# Patient Record
Sex: FEMALE | Race: WHITE | NOT HISPANIC OR LATINO | Employment: UNEMPLOYED | URBAN - METROPOLITAN AREA
[De-identification: names, ages, dates, MRNs, and addresses within clinical notes are randomized per-mention and may not be internally consistent; named-entity substitution may affect disease eponyms.]

---

## 2018-07-22 ENCOUNTER — HOSPITAL ENCOUNTER (EMERGENCY)
Facility: HOSPITAL | Age: 53
Discharge: HOME | End: 2018-07-22
Attending: EMERGENCY MEDICINE
Payer: COMMERCIAL

## 2018-07-22 ENCOUNTER — APPOINTMENT (EMERGENCY)
Dept: RADIOLOGY | Facility: HOSPITAL | Age: 53
End: 2018-07-22
Payer: COMMERCIAL

## 2018-07-22 VITALS
SYSTOLIC BLOOD PRESSURE: 122 MMHG | RESPIRATION RATE: 16 BRPM | DIASTOLIC BLOOD PRESSURE: 61 MMHG | OXYGEN SATURATION: 97 % | WEIGHT: 106 LBS | HEART RATE: 86 BPM

## 2018-07-22 DIAGNOSIS — T14.8XXA PUNCTURE WOUND: Primary | ICD-10-CM

## 2018-07-22 DIAGNOSIS — L03.90 CELLULITIS: ICD-10-CM

## 2018-07-22 PROCEDURE — 73630 X-RAY EXAM OF FOOT: CPT

## 2018-07-22 PROCEDURE — 90471 IMMUNIZATION ADMIN: CPT

## 2018-07-22 PROCEDURE — 90715 TDAP VACCINE 7 YRS/> IM: CPT | Performed by: EMERGENCY MEDICINE

## 2018-07-22 PROCEDURE — 99283 EMERGENCY DEPT VISIT LOW MDM: CPT

## 2018-07-22 RX ORDER — CLINDAMYCIN HYDROCHLORIDE 300 MG/1
300 CAPSULE ORAL 4 TIMES DAILY
Qty: 28 CAPSULE | Refills: 0 | Status: SHIPPED | OUTPATIENT
Start: 2018-07-22 | End: 2018-07-29

## 2018-07-22 RX ORDER — GINSENG 100 MG
1 CAPSULE ORAL ONCE
Status: COMPLETED | OUTPATIENT
Start: 2018-07-22 | End: 2018-07-22

## 2018-07-22 RX ORDER — CLINDAMYCIN HYDROCHLORIDE 150 MG/1
300 CAPSULE ORAL ONCE
Status: COMPLETED | OUTPATIENT
Start: 2018-07-22 | End: 2018-07-22

## 2018-07-22 RX ORDER — DIPHENOXYLATE HYDROCHLORIDE AND ATROPINE SULFATE 2.5; .025 MG/1; MG/1
1 TABLET ORAL DAILY
COMMUNITY

## 2018-07-22 RX ADMIN — TETANUS TOXOID, REDUCED DIPHTHERIA TOXOID AND ACELLULAR PERTUSSIS VACCINE, ADSORBED 0.5 ML: 5; 2.5; 8; 8; 2.5 SUSPENSION INTRAMUSCULAR at 17:41

## 2018-07-22 RX ADMIN — CLINDAMYCIN HYDROCHLORIDE 300 MG: 150 CAPSULE ORAL at 17:42

## 2018-07-22 RX ADMIN — BACITRACIN ZINC 1 SMALL APPLICATION: 500 OINTMENT TOPICAL at 17:42

## 2018-07-22 NOTE — DISCHARGE INSTRUCTIONS
Cellulitis, Ambulatory Care   GENERAL INFORMATION:   Cellulitis  is a skin infection caused by bacteria  Common symptoms include the following:   · Fever    · A red, warm, swollen area on your skin    · Pain when the area is touched    · Bumps or blisters (abscess) that may drain pus    · Bumpy, raised skin that feels like an orange peel  Seek immediate care for the following symptoms:   · An increase in pain, redness, warmth, and size    · Red streaks coming from the infected area    · A thin, gray-brown discharge coming from your infected skin area    · A crackling under your skin when you touch it    · Purple dots or bumps on your skin, or bleeding under your skin    · New swelling and pain in your legs    · Sudden trouble breathing or chest pain  Treatment for cellulitis  may include medicines to treat the bacterial infection or decrease pain  The infection may need to be cleaned out  Damaged, dead, or infected tissue may need to be cut away to help your wound heal   Manage your symptoms:   · Elevate your wound above the level of your heart  as often as you can  This will help decrease swelling and pain  Prop your wound on pillows or blankets to keep it elevated comfortably  · Clean your wound as directed  You may need to wash the wound with soap and water  Look for signs of infection  · Wear pressure stockings as directed  The stockings are tight and put pressure on your legs  This improves blood flow and decreases swelling  Prevent cellulitis:   · Wash your hands often  Use soap and water  Wash your hands after you use the bathroom, change a child's diapers, or sneeze  Wash your hands before you prepare or eat food  Use lotion to prevent dry, cracked skin  · Do not share personal items, such as towels, clothing, and razors  · Clean exercise equipment  with germ-killing  before and after you use it    Follow up with your healthcare provider as directed:  Write down your questions so you remember to ask them during your visits  CARE AGREEMENT:   You have the right to help plan your care  Learn about your health condition and how it may be treated  Discuss treatment options with your caregivers to decide what care you want to receive  You always have the right to refuse treatment  The above information is an  only  It is not intended as medical advice for individual conditions or treatments  Talk to your doctor, nurse or pharmacist before following any medical regimen to see if it is safe and effective for you  © 2014 [address] is for End User's use only and may not be sold, redistributed or otherwise used for commercial purposes  All illustrations and images included in CareNotes® are the copyrighted property of [employer]  or David Adams

## 2018-07-22 NOTE — ED PROVIDER NOTES
History  Chief Complaint   Patient presents with    Foot Injury     Pt  states dropped pruning femi on to right foot on Friday now has increased swelling, redness, and warmth to area  Tetanus not up to date  53 yo female comes in for evaluation of foot in jury  States that 2 days ago dropped metal pruning femi on foot  sustqained small puncture wound  Tetanus not UTD  Now has swelling and reddnes of foot         History provided by:  Patient   used: No    Foot Injury - Major   Location:  Foot  Time since incident:  2 days  Injury: yes    Mechanism of injury: stab wound    Mechanism of injury comment:  Accident   Stab wound:     Number of wounds:  1    Penetrating object: pruning sheers  Blade type:  Single-edged    Edge type:  Unable to specify    Inflicted by:  Self    Suspected intent:  Accidental  Foot location:  Dorsum of R foot  Pain details:     Quality:  Pressure    Radiates to:  Does not radiate    Severity:  Moderate    Onset quality:  Gradual    Duration:  2 days    Timing:  Constant    Progression:  Worsening  Chronicity:  New  Dislocation: no    Foreign body present:  No foreign bodies  Tetanus status:  Out of date  Prior injury to area:  No  Ineffective treatments:  None tried  Associated symptoms: swelling    Associated symptoms: no back pain, no fatigue and no fever    Swelling:     Location:  Feet    Onset quality:  Gradual    Duration:  2 days    Timing:  Constant    Progression:  Worsening    Chronicity:  New  Risk factors: no concern for non-accidental trauma and no recent illness        Prior to Admission Medications   Prescriptions Last Dose Informant Patient Reported? Taking?   multivitamin (THERAGRAN) TABS   Yes Yes   Sig: Take 1 tablet by mouth daily      Facility-Administered Medications: None       History reviewed  No pertinent past medical history  History reviewed  No pertinent surgical history  History reviewed  No pertinent family history    I have reviewed and agree with the history as documented  Social History   Substance Use Topics    Smoking status: Current Every Day Smoker    Smokeless tobacco: Never Used    Alcohol use No        Review of Systems   Constitutional: Negative for fatigue and fever  HENT: Negative for congestion and ear pain  Eyes: Negative for discharge and redness  Respiratory: Negative for apnea, cough, shortness of breath and wheezing  Cardiovascular: Negative for chest pain  Gastrointestinal: Negative for abdominal pain and diarrhea  Endocrine: Negative for cold intolerance and polydipsia  Genitourinary: Negative for difficulty urinating and hematuria  Musculoskeletal: Negative for arthralgias and back pain  Skin: Negative for color change and rash  Allergic/Immunologic: Negative for environmental allergies and immunocompromised state  Neurological: Negative for numbness and headaches  Hematological: Negative for adenopathy  Does not bruise/bleed easily  Psychiatric/Behavioral: Negative for agitation and behavioral problems  Physical Exam  Physical Exam   Constitutional: She is oriented to person, place, and time  Vital signs are normal  She appears well-developed and well-nourished  Non-toxic appearance  HENT:   Head: Normocephalic and atraumatic  Right Ear: Tympanic membrane and external ear normal    Left Ear: Tympanic membrane and external ear normal    Nose: Nose normal  No rhinorrhea, sinus tenderness or nasal deformity  Mouth/Throat: Uvula is midline and oropharynx is clear and moist  Normal dentition  Eyes: Conjunctivae, EOM and lids are normal  Pupils are equal, round, and reactive to light  Right eye exhibits no discharge  Left eye exhibits no discharge  Neck: Trachea normal and normal range of motion  Neck supple  No JVD present  Carotid bruit is not present  Cardiovascular: Normal rate, regular rhythm, intact distal pulses and normal pulses     No extrasystoles are present  PMI is not displaced  Pulmonary/Chest: Effort normal and breath sounds normal  No accessory muscle usage  No respiratory distress  She has no wheezes  She has no rhonchi  She has no rales  Abdominal: Soft  Normal appearance and bowel sounds are normal  She exhibits no mass  There is no tenderness  There is no rigidity, no rebound and no guarding  Musculoskeletal:        Right shoulder: She exhibits normal range of motion, no bony tenderness, no swelling and no deformity  Right ankle: She exhibits swelling  Tenderness  Cervical back: Normal  She exhibits normal range of motion, no tenderness, no bony tenderness and no deformity  Feet:    Feet:   Right Foot:   Skin Integrity: Positive for erythema  Lymphadenopathy:     She has no cervical adenopathy  She has no axillary adenopathy  Neurological: She is alert and oriented to person, place, and time  She has normal strength and normal reflexes  No cranial nerve deficit or sensory deficit  GCS eye subscore is 4  GCS verbal subscore is 5  GCS motor subscore is 6  Skin: Skin is warm and dry  No rash noted  Psychiatric: She has a normal mood and affect  Her speech is normal and behavior is normal    Nursing note and vitals reviewed        Vital Signs  ED Triage Vitals [07/22/18 1631]   Temp Pulse Respirations Blood Pressure SpO2   -- 86 16 122/61 97 %      Temp Source Heart Rate Source Patient Position - Orthostatic VS BP Location FiO2 (%)   Oral Monitor Sitting Left arm --      Pain Score       1           Vitals:    07/22/18 1631   BP: 122/61   Pulse: 86   Patient Position - Orthostatic VS: Sitting       Visual Acuity      ED Medications  Medications   tetanus-diphtheria-acellular pertussis (BOOSTRIX) IM injection 0 5 mL (0 5 mL Intramuscular Given 7/22/18 1741)   clindamycin (CLEOCIN) capsule 300 mg (300 mg Oral Given 7/22/18 1742)   bacitracin topical ointment 1 small application (1 small application Topical Given 7/22/18 9045)       Diagnostic Studies  Results Reviewed     None                 XR foot 3+ views RIGHT   Final Result by Lilia Waterman DO (07/22 1837)      No acute osseous abnormality  Workstation performed: [id number]                    Procedures  Procedures       Phone Contacts  ED Phone Contact    ED Course                               MDM  Number of Diagnoses or Management Options  Cellulitis: new and requires workup  Puncture wound: new and requires workup     Amount and/or Complexity of Data Reviewed  Tests in the radiology section of CPT®: ordered and reviewed  Independent visualization of images, tracings, or specimens: yes    Patient Progress  Patient progress: stable    CritCare Time    Disposition  Final diagnoses:   Puncture wound   Cellulitis     Time reflects when diagnosis was documented in both MDM as applicable and the Disposition within this note     Time User Action Codes Description Comment    7/22/2018  5:27 PM Vani Gomez  8XXA] Puncture wound     7/22/2018  5:27 PM Thanh Escobar [L03 90] Cellulitis       ED Disposition     ED Disposition Condition Comment    Discharge  Gilda Drew discharge to home/self care  Condition at discharge: Good        Follow-up Information     Follow up With Specialties Details Why Contact Info    Bruce Lizama DO Family Medicine Schedule an appointment as soon as possible for a visit  [id number]  [address] [place] [address]  [phone]            Discharge Medication List as of 7/22/2018  5:30 PM      START taking these medications    Details   clindamycin (CLEOCIN) 300 MG capsule Take 1 capsule (300 mg total) by mouth 4 (four) times a day for 7 days, Starting Sun 7/22/2018, Until Sun 7/29/2018, Print         CONTINUE these medications which have NOT CHANGED    Details   multivitamin (THERAGRAN) TABS Take 1 tablet by mouth daily, Historical Med           No discharge procedures on file      ED Provider  Electronically Signed by           Remigio Landa,   07/24/18 1136

## 2021-05-17 ENCOUNTER — IMMUNIZATIONS (OUTPATIENT)
Dept: FAMILY MEDICINE CLINIC | Facility: HOSPITAL | Age: 56
End: 2021-05-17

## 2021-05-17 DIAGNOSIS — Z23 ENCOUNTER FOR IMMUNIZATION: Primary | ICD-10-CM

## 2021-05-17 PROCEDURE — 0001A SARS-COV-2 / COVID-19 MRNA VACCINE (PFIZER-BIONTECH) 30 MCG: CPT

## 2021-05-17 PROCEDURE — 91300 SARS-COV-2 / COVID-19 MRNA VACCINE (PFIZER-BIONTECH) 30 MCG: CPT

## 2021-06-07 ENCOUNTER — IMMUNIZATIONS (OUTPATIENT)
Dept: FAMILY MEDICINE CLINIC | Facility: HOSPITAL | Age: 56
End: 2021-06-07

## 2021-06-07 DIAGNOSIS — Z23 ENCOUNTER FOR IMMUNIZATION: Primary | ICD-10-CM

## 2021-06-07 PROCEDURE — 91300 SARS-COV-2 / COVID-19 MRNA VACCINE (PFIZER-BIONTECH) 30 MCG: CPT

## 2021-06-07 PROCEDURE — 0002A SARS-COV-2 / COVID-19 MRNA VACCINE (PFIZER-BIONTECH) 30 MCG: CPT
